# Patient Record
Sex: MALE | Race: BLACK OR AFRICAN AMERICAN | NOT HISPANIC OR LATINO | Employment: FULL TIME | URBAN - METROPOLITAN AREA
[De-identification: names, ages, dates, MRNs, and addresses within clinical notes are randomized per-mention and may not be internally consistent; named-entity substitution may affect disease eponyms.]

---

## 2017-08-07 ENCOUNTER — OFFICE VISIT (OUTPATIENT)
Dept: INTERNAL MEDICINE | Facility: CLINIC | Age: 42
End: 2017-08-07
Payer: COMMERCIAL

## 2017-08-07 VITALS
OXYGEN SATURATION: 98 % | TEMPERATURE: 96 F | BODY MASS INDEX: 31.77 KG/M2 | SYSTOLIC BLOOD PRESSURE: 178 MMHG | WEIGHT: 247.56 LBS | HEIGHT: 74 IN | HEART RATE: 85 BPM | DIASTOLIC BLOOD PRESSURE: 100 MMHG

## 2017-08-07 DIAGNOSIS — I10 ESSENTIAL HYPERTENSION: Primary | ICD-10-CM

## 2017-08-07 PROCEDURE — 99999 PR PBB SHADOW E&M-NEW PATIENT-LVL III: CPT | Mod: PBBFAC,,, | Performed by: PHYSICIAN ASSISTANT

## 2017-08-07 PROCEDURE — 99203 OFFICE O/P NEW LOW 30 MIN: CPT | Mod: S$GLB,,, | Performed by: PHYSICIAN ASSISTANT

## 2017-08-07 PROCEDURE — 3008F BODY MASS INDEX DOCD: CPT | Mod: S$GLB,,, | Performed by: PHYSICIAN ASSISTANT

## 2017-08-07 RX ORDER — AMLODIPINE BESYLATE 5 MG/1
5 TABLET ORAL DAILY
Qty: 30 TABLET | Refills: 0 | Status: SHIPPED | OUTPATIENT
Start: 2017-08-07 | End: 2017-08-14 | Stop reason: SDUPTHER

## 2017-08-07 NOTE — PATIENT INSTRUCTIONS
Eating Heart-Healthy Foods  Eating has a big impact on your heart health. In fact, eating healthier can improve several of your heart risks at once. For instance, it helps you manage weight, cholesterol, and blood pressure. Here are ideas to help you make heart-healthy changes without giving up all the foods and flavors you love.  Getting started  · Talk with your health care provider about eating plans, such as the DASH or Mediterranean diet. You may also be referred to a dietitian.  · Change a few things at a time. Give yourself time to get used to a few eating changes before adding more.  · Work to create a tasty, healthy eating plan that you can stick to for the rest of your life.    Goals for healthy eating  Below are some tips to improve your eating habits:  · Limit saturated fats and trans fats. Saturated fats raise your levels of cholesterol, so keep these fats to a minimum. They are found in foods such as fatty meats, whole milk, cheese, and palm and coconut oils. Avoid trans fats because they lower good cholesterol as well as raise bad cholesterol. Trans fats are most often found in processed foods.  · Reduce sodium (salt) intake. Eating too much salt may increase your blood pressure. Limit your sodium intake to 2,300 milligrams (mg) per day, or less if your health care provider recommends it. Dining out less often and eating fewer processed foods are two great ways to decrease the amount of salt you consume.  · Managing calories. A calorie is a unit of energy. Your body burns calories for fuel, but if you eat more calories than your body burns, the extras are stored as fat. Your health care provider can help you create a diet plan to manage your calories. This will likely include eating healthier foods as well as exercising regularly. To help you track your progress, keep a diary to record what you eat and how often you exercise.  Choose the right foods  Aim to make these foods staples of your diet. If  you have diabetes, you may have different recommendations than what is listed here:  · Fruits and vegetable provide plenty of nutrients without a lot of calories. At meals, fill half your plate with these foods. Split the other half of your plate between whole grains and lean protein.  · Whole grains are high in fiber and rich in vitamins and nutrients. Good choices include whole-wheat bread, pasta, and brown rice.  · Lean proteins give you nutrition with less fat. Good choices include fish, skinless chicken, and beans.  · Low-fat or nonfat dairy provides nutrients without a lot of fat. Try low-fat or nonfat milk, cheese, or yogurt.  · Healthy fats can be good for you in small amounts. These are unsaturated fats, such as olive oil, nuts, and fish. Try to have at least 2 servings per week of fatty fish such as salmon, sardines, mackerel, rainbow trout, and albacore tuna. These contain omega-3 fatty acids, which are good for your heart. Flaxseed is another source of a heart-healthy fat.  More on heart healthy eating    Read food labels  Healthy eating starts at the grocery store. Be sure to pay attention to food labels on packaged foods. Look for products that are high in fiber and protein, and low in saturated fat, cholesterol, and sodium. Avoid products that contain trans fat. And pay close attention to serving size. For instance, if you plan to eat two servings, double all the numbers on the label.  Prepare food right  A key part of healthy cooking is cutting down on added fat and salt. Look on the internet for lower-fat, lower-sodium recipes. Also, try these tips:  · Remove fat from meat and skin from poultry before cooking.  · Skim fat from the surface of soups and sauces.  · Broil, boil, bake, steam, grill, and microwave food without added fats.  · Choose ingredients that spice up your food without adding calories, fat, or sodium. Try these items: horseradish, hot sauce, lemon, mustard, nonfat salad dressings,  and vinegar. For salt-free herbs and spices, try basil, cilantro, cinnamon, pepper, and rosemary.  Date Last Reviewed: 6/25/2015 © 2000-2016 [employer]. [address] [place] [address], [place] [address]. All rights reserved. This information is not intended as a substitute for professional medical care. Always follow your healthcare professional's instructions.        Exercise for a Healthier Heart  You may wonder how you can improve the health of your heart. If youre thinking about exercise, youre on the right track. You dont need to become an athlete, but you do need a certain amount of brisk exercise to help strengthen your heart. If you have been diagnosed with a heart condition, your doctor may recommend exercise to help stabilize your condition. To help make exercise a habit, choose safe, fun activities.     Exercise with a friend. When activity is fun, you're more likely to stick with it.     Be sure to check with your healthcare provider before starting an exercise program.   Why exercise?  Exercising regularly offers many healthy rewards. It can help you do all of the following:  · Improve your blood cholesterol level to help prevent further heart trouble  · Lower your blood pressure to help prevent a stroke or heart attack  · Control diabetes, or reduce your risk of getting this disease  · Improve your heart and lung function  · Reach and maintain a healthy weight  · Make your muscles stronger and more limber so you can stay active  · Prevent falls and fractures by slowing the loss of bone mass (osteoporosis)  · Manage stress better  · Reduce your blood pressure  · Improve your sense of self and your body image  Exercise tips  Ease into your routine. Set small goals. Then build on them.  Exercise on most days. Aim for a total of 150 or more minutes of moderate to  vigorous intensity activity each week. Consider 40 minutes, 3 to 4 times a week. For best results, activity should last for 40  minutes on average. It is OK to work up to the 40 minute period over time. Examples of moderate-intensity activity is walking 1 mile in 15 minutes or 30 to 45 minutes of yard work.  Step up your daily activity level. Along with your exercise program, try being more active throughout the day. Walk instead of drive. Do more household tasks or yard work.  Choose one or more activities you enjoy. Walking is one of the easiest things you can do. You can also try swimming, riding a bike, dancing, or taking an exercise class.  Stop exercising and call your doctor if you:  · Have chest pain or feel dizzy or lightheaded  · Feel burning, tightness, pressure, or heaviness in your chest, neck, shoulders, back, or arms  · Have unusual shortness of breath  · Have increased joint or muscle pain  · Have palpitations or an irregular heartbeat   Date Last Reviewed: 5/1/2016 © 2000-2016 [employer]. [address] [place] [address]. All rights reserved. This information is not intended as a substitute for professional medical care. Always follow your healthcare professional's instructions.        Discharge Instructions: Taking Your Blood Pressure  Blood pressure is the force of blood as it moves from the heart through the blood vessels. You can take your own blood pressure reading using a digital monitor. Take readings as often as your healthcare provider instructs. Take your readings each time in the same way, using the same arm. Here are guidelines for taking your blood pressure.  The American Heart Association (AHA) recommends purchasing a blood pressure monitor that is validated and approved by the Association for the Advancement of Medical Instrumentation, the [place] Hypertension Society, and the International Protocol for the Validation of Automated BP Measuring Devices. If the blood pressure monitor is for a senior adult, a pregnant woman, or a child, make certain it is validated for use with such a  population. For the most reliable readings, the AHA recommends an automatic, cuff-style, upper arm (bicep) monitor. The readings from finger and wrist monitors are not as reliable as the upper arm monitor.        Step 1. Relax    · Wait at least a half hour after smoking, eating, or exercising. Do not drink coffee, tea, soda, or other caffeinated beverages before checking your blood pressure.   · Sit comfortably at a table. Place the monitor near you.  · Rest for a few minutes before you begin.        Step 2. Wrap the cuff    · Place your arm on the table, palm up. Put your arm in a position that is level with your heart. Wrap the cuff around your upper arm, about an inch above your elbow. Its best to wrap the cuff on bare skin, not over clothing.  · Make sure your cuff fits. If it doesnt wrap around your upper arm, order a larger cuff. A cuff that is too large or too small can result in an inaccurate blood pressure reading.           Step 3. Inflate the cuff    · Pump the cuff until the scale reads 200. If you have a self-inflating cuff, push the button that starts the pump.  · The cuff will tighten, then loosen.  · The numbers will change. When they stop changing, your blood pressure reading will appear.  · If you get a reading that is too high or too low for you, relax for a few minutes. Then do the test again.    Step 4. Write down the results  · Write down your blood pressure numbers. Leonardo the date and time. Keep your results in one place, such as a notebook.  · Remove the cuff from your arm. Turn off the machine.  · Take the readings with you to your medical appointments.  · If you start a new blood pressure medicine, or change a blood pressure medicine dose, note the day you started the new drug or dosage on your blood pressure recording sheet. This will help your healthcare provider monitor the effect of medication changes.     Date Last Reviewed: 4/27/2016  © 1466-5207 [employer] [address]  [place] [address]. All rights reserved. This information is not intended as a substitute for professional medical care. Always follow your healthcare professional's instructions.        Taking Your Blood Pressure  Blood pressure is the force of blood against the artery wall as it moves from the heart through the blood vessels. You can take your own blood pressure reading using a digital monitor. Take readings as often as your healthcare provider instructs. Take each reading at the same time of day.  Step 1. Relax    · Take your blood pressure at the same time every day, such as in the morning or evening, or at the time your healthcare provider recommends.  · Wait at least a half-hour after smoking, eating, drinking caffeinated beverages, or exercising.  · Sit comfortably at a table with both feet on the floor. Do not cross your legs or feet. Place the monitor near you.  · Rest for a few minutes before you begin.  Step 2. Wrap the cuff    · Place your arm on the table, palm up. Your arm should be at the level of your heart. Wrap the cuff around your upper arm, just above your elbow. Its best done on bare skin, not over clothing. Most cuffs will indicate where the brachial artery (the blood vessel in the middle of the arm at the inner side of the elbow) should line up with the cuff. Look in your monitor's instruction booklet for an illustration. You can also bring your cuff to your healthcare provider and have them show you how to correctly place the cuff.  · Make sure your cuff fits. If it doesnt wrap around your upper arm, order a larger cuff.  Step 3. Inflate the cuff    · Pump the cuff until the scale reads 160. If you have a self-inflating cuff, push the button that starts the pump.  · The cuff will tighten, then loosen.  · The numbers will change. When they stop changing, your blood pressure reading will appear.  · Take 2 or 3 readings one minute apart.  Step 4. Write down the results of  each reading    · Write down your blood pressure numbers for each reading. Note the date and time. Keep your results in one place, such as a notebook. Even if your monitor has a built-in memory, keep a hard copy of the readings.  · Remove the cuff from your arm. Turn off the machine.  · Share your blood pressure records with your healthcare providers at each visit.  Date Last Reviewed: 4/27/2016 © 2000-2016 [employer] [address] [place] [address], [place] [address]. All rights reserved. This information is not intended as a substitute for professional medical care. Always follow your healthcare professional's instructions.        What is High Blood Pressure?  High blood pressure (also called hypertension) is known as the silent killer. This is because most of the time it doesnt cause symptoms. In fact, many people dont know they have it until other problems develop. In most cases, high blood pressure cant be cured. Its a disease that often requires lifelong treatment. The good news is that it can be managed.  Understanding blood pressure  The circulatory system is made up of the heart and blood vessels that carry blood through the body. Your heart is the pump for this system. With each heartbeat (contraction), the heart sends blood out through large blood vessels called arteries. Blood pressure is a measure of how hard the moving blood pushes against the walls of the arteries.  High blood pressure can harm your health  In a healthy blood vessel, the blood moves smoothly through the vessel and puts normal pressure on the vessel walls.       High blood pressure occurs when blood pushes too hard against artery walls. This causes damage to the artery walls and then the formation of scar tissue as it heals. This makes the arteries stiff and weak. Plaque sticks to the scarred tissue narrowing and hardening the arteries. High blood pressure also causes your heart to work harder to get blood out to the  "body. High blood pressure raises your risk of heart attack, also known as acute myocardial infarction, or AMI, and stroke. It can also lead to kidney disease, and blindness.  Measuring blood pressure  An example of a blood pressure measurement is 120/70 (120 over 70). The top number is the pressure of blood against the artery walls during a heartbeat (systolic). The bottom number is the pressure of blood against artery walls between heartbeats (diastolic). Talk with your healthcare provider to find out what your blood pressure goals should be.   Controlling blood pressure  If your blood pressure is too high, work with your doctor on a plan for lowering it. Below are steps you can take that will help lower your blood pressure.  · Choose heart-healthy foods. Eating healthier meals helps you control your blood pressure. Ask your doctor about the DASH eating plan. This plan helps reduce blood pressure by limiting the amount of sodium (salt) you have in your diet. DASH also encourages eating plenty of fruits and vegetables, low-fat or non-fat dairy, whole-grains, and foods high in fiber, and low in fat.  · Reduce sodium. Reducing sodium in your diet reduces fluid retention. Fluid retention caused by too much salt increases blood volume and blood pressure. The American Heart Associations (AHA) "ideal" sodium intake recommendation is 1,500 milligrams per day.  However, since American's eat so much salt, the AHA says a positive change can occur by cutting back to even 2,400 milligrams of sodium a day.  · Maintain a healthy weight. Being overweight makes you more likely to have high blood pressure. Losing excess weight helps lower blood pressure.  · Exercise regularly. Daily exercise helps your heart and blood vessels work better and stay healthier. It can help lower your blood pressure.  · Stop smoking. Smoking increases blood pressure and damages blood vessels.  · Limit alcohol. Drinking too much alcohol can raise blood " pressure. Men should have no more than 2 drinks a day. Women should have no more than 1. (A drink is equal to 1 beer, or a small glass of wine, or a shot of liquor.)  · Control stress. Stress makes your heart work harder and beat faster. Controlling stress helps you control your blood pressure.  Facts about high blood pressure  · Feeling OK does not mean that blood pressure is under control. Likewise, feeling bad doesnt mean its out of control. The only way to know for sure is to check your pressure regularly.  · Medicine is only one part of controlling high blood pressure. You also need to manage your weight, get regular exercise, and adjust your eating habits.  · High blood pressure is usually a lifelong problem. But it can be controlled with healthy lifestyle changes and medicine.  · Hypertension is not the same as stress. Although stress may be a factor in high blood pressure, its only one part of the story.  · Blood pressure medicines need to be taken every day. Stopping suddenly may cause a dangerous increase in pressure.   Date Last Reviewed: 4/27/2016 © 2000-2016 [employer]. [address] [place], [place] [id number]. All rights reserved. This information is not intended as a substitute for professional medical care. Always follow your healthcare professional's instructions.        Your High Blood Pressure Risk Factors  Risk factors are things that make you more likely to have a disease or condition. Do you know your risk factors for high blood pressure? You cant do anything about some risk factors. But other risk factors are things that can be changed. Know what high blood pressure risk factors you have. Then find out what changes you can make to help control your risk for high blood pressure. Start with the change that you think will be easiest for you.  Risk factors you cant control  Though you cant change any of the things listed below, check off the ones that apply to you. The more  boxes you check, the greater your risk for high blood pressure.  Family history  ? One or both of your parents or grandparents has had high blood pressure or heart disease.  Gender and age  ? Youre a man over age 55 or a postmenopausal woman.  Risk factors you can control  There are plenty of risk factors for high blood pressure that you can control. Learn what these risk factors are and then find out how to reduce your risk. Check the ones that apply to you.  ? What you eat  Do you eat a lot of salty, fatty, fried, or greasy foods? Do you go to restaurants or eat out frequently?  ? Alcohol consumption  Do you drink more than 1 drink a day if you are a female or more than 2 drinks a day if you male?   ? If you smoke or someone close to you smokes  Do you smoke cigarettes or cigars, chew tobacco, or dip snuff? Are you exposed to second-hand smoke on a regular basis?  ? How active you are   Are you inactive most of the time at work and at home? Do you go weeks without exercising?  ? Your weight   Has your doctor said that you are 15 or more pounds overweight?  ? Your stress level    Do you often feel anxious, nervous, and stressed? Do you feel that you don't have a supportive environment?  Date Last Reviewed: 4/27/2016 © 2000-2016 [employer]. [address] [place] [address], [place] [address]. All rights reserved. This information is not intended as a substitute for professional medical care. Always follow your healthcare professional's instructions.        High Blood Pressure, New, Begin Treatment  Your blood pressure was high enough today to start treatment with medicines. Often health care providers dont know what causes high blood pressure (hypertension). But it can be controlled with lifestyle changes and medicines. High blood pressure usually has no symptoms. But it can sometimes cause headache, dizziness, blurred vision, a rushing sound in your ears, chest pain, or shortness of breath. But even without  symptoms, high blood pressure thats not treated raises your risk for heart attack and stroke. High blood pressure is a serious health risk and shouldnt be ignored.    A blood pressure reading is made up of two numbers: a higher number over a lower number. The top number is the systolic pressure. The bottom number is the diastolic pressure. A normal blood pressure is less than 120 over less than 80.  High blood pressure is when either the top number is 140 or higher, or the bottom number is 90 or higher. This must be the result when taking your blood pressure a number of times. The blood pressures between normal and high are called prehypertension.  Home care  If you have high blood pressure, you should do what is listed below to lower your blood pressure. If you are taking medicines for high blood pressure, these methods may reduce or end your need for medicines in the future.  · Begin a weight-loss program if you are overweight.  · Cut back on how much salt you get in your diet. Heres how to do this:  ¨ Dont eat foods that have a lot of salt. These include olives, pickles, smoked meats, and salted potato chips.  ¨ Dont add salt to your food at the table.  ¨ Use only small amounts of salt when cooking.  · Begin an exercise program. Talk with your health care provider about the type of exercise program that would be best for you. It doesn't have to be hard. Even brisk walking for 20 minutes 3 times a week is a good form of exercise.  · Dont take medicines that have heart stimulants. This includes many cold and sinus decongestant pills and sprays, as well as diet pills. Check the warnings about hypertension on the label. Stimulants such as amphetamine or cocaine could be lethal for someone with high blood pressure. Never take these.  · Limit how much caffeine you get in your diet. Switch to caffeine-free products.  · Stop smoking. If you are a long-time smoker, this can be hard. Enroll in a stop-smoking program  to make it more likely that you will quit for good.  · Learn how to handle stress. This is an important part of any program to lower blood pressure. Learn about relaxation methods like meditation, yoga, or biofeedback.  · If your provider prescribed medicines, take them exactly as directed. Missing doses may cause your blood pressure get out of control.  · Consider buying an automatic blood pressure machine. You can get one of these at most pharmacies. Use this to watch your blood pressure at home. Give the results to your provider.  Follow-up care  Because a new blood pressure medicine was started today, its important that you have your blood pressure rechecked. This is to make sure that the medicine is working and that you have no serious side effects. Unless told otherwise, follow up with your health care provider or this facility within the next 3 days.  When to seek medical advice  Call your health care provider right away if any of these occur:  · Chest pain or shortness of breath  · Severe headache  · Throbbing or rushing sound in the ears  · Nosebleed  · Sudden severe pain in your belly (abdomen)  · Extreme drowsiness, confusion, or fainting  · Dizziness or dizziness with a spinning sensation (vertigo)  · Weakness of an arm or leg or one side of the face  · You have problems speaking or seeing   Date Last Reviewed: 11/25/2014  © 5362-8709 [employer]. [address] [place] [address], [place] [address]. All rights reserved. This information is not intended as a substitute for professional medical care. Always follow your healthcare professional's instructions.        High Blood Pressure, To Be Confirmed, No Treatment  Your blood pressure today was higher than normal. Sometimes anxiety or pain can cause a temporary rise in blood pressure. It later returns to normal. Blood pressure that is high only one time doesnt mean that you have high blood pressure (hypertension). High blood pressure is a chronic  illness. But you should have your blood pressure measured again within the next few days to find out if its still high.    A blood pressure reading is made up of two numbers: a higher number over a lower number. The top number is the systolic pressure. The bottom number is the diastolic pressure. A normal blood pressure is less than 120 over less than 80.  High blood pressure is when either the top number is 140 or higher, or the bottom number is 90 or higher. This must be the result when taking your blood pressure a number of times.   The blood pressures between normal and high are called prehypertension. This is systolic pressure of 120 to 140 or diastolic pressure of 80 to 89. Prehypertension means you are at risk of getting high blood pressure. You should have your blood pressure checked regularly to be sure it isnt rising.  Home care  Measure your blood pressure on 3 different days and write down the results. This can be done at your health care provider's office or at this facility. Some pharmacies and grocery stores have automated blood pressure machines that you can use.  Follow-up care  If your blood pressure is high (more than 120 over 80) on 2 out of 3 days, you will need to follow up with your health care provider for more evaluation and treatment.  Dont put this off! High blood pressure can be treated. High blood pressure thats not treated raises your risk for heart attack and stroke.  When to seek medical advice  Call your health care provider right away if any of these occur:  · Chest pain or shortness of breath  · Severe headache  · Throbbing or rushing sound in the ears  · Nosebleed  · Sudden severe pain in your belly (abdomen)  · Extreme drowsiness, confusion, or fainting  · Dizziness or dizziness with spinning sensation (vertigo)  · Weakness of an arm or leg or one side of the face  · You have problems speaking or seeing   Date Last Reviewed: 11/25/2014  © 6719-9681 [employer]  [address] [place] [id number]. All rights reserved. This information is not intended as a substitute for professional medical care. Always follow your healthcare professional's instructions.

## 2017-08-07 NOTE — PROGRESS NOTES
Subjective:      Patient ID: Vinay Velazquez is a 42 y.o. male.    Chief Complaint: Hypertension    Does exercise and eats healthy.      Hypertension   This is a new problem. The current episode started in the past 7 days. The problem is unchanged. The problem is uncontrolled. Pertinent negatives include no anxiety, blurred vision, chest pain, headaches, malaise/fatigue, neck pain, orthopnea, palpitations, peripheral edema, PND, shortness of breath or sweats. There are no associated agents to hypertension. Risk factors for coronary artery disease include male gender and smoking/tobacco exposure. Past treatments include nothing. The current treatment provides no improvement. There are no compliance problems.  There is no history of angina, kidney disease, CAD/MI, CVA, heart failure, left ventricular hypertrophy, PVD, renovascular disease, retinopathy or a thyroid problem. There is no history of chronic renal disease, coarctation of the aorta, hyperaldosteronism, hypercortisolism, hyperparathyroidism, a hypertension causing med, pheochromocytoma or sleep apnea.   Had physical for work and blood pressure was 160/110 and 164/104 on 8/3/2017 and 8/4/2017.  Does not have a PCP    Review of Systems   Constitutional: Negative for activity change, appetite change, chills, diaphoresis, fatigue, fever, malaise/fatigue and unexpected weight change.   HENT: Negative.  Negative for congestion, hearing loss, postnasal drip, rhinorrhea, sore throat, trouble swallowing and voice change.    Eyes: Negative.  Negative for blurred vision and visual disturbance.   Respiratory: Negative.  Negative for cough, choking, chest tightness and shortness of breath.    Cardiovascular: Negative for chest pain, palpitations, orthopnea, leg swelling and PND.   Gastrointestinal: Negative for abdominal distention, abdominal pain, blood in stool, constipation, diarrhea, nausea and vomiting.   Endocrine: Negative for cold intolerance, heat intolerance,  "polydipsia and polyuria.   Genitourinary: Negative.  Negative for difficulty urinating and frequency.   Musculoskeletal: Negative for arthralgias, back pain, gait problem, joint swelling, myalgias and neck pain.   Skin: Negative for color change, pallor, rash and wound.   Neurological: Negative for dizziness, tremors, weakness, light-headedness, numbness and headaches.   Hematological: Negative for adenopathy.   Psychiatric/Behavioral: Negative for behavioral problems, confusion, self-injury, sleep disturbance and suicidal ideas. The patient is not nervous/anxious.      Objective:   BP (!) 178/100   Pulse 85   Temp 96.2 °F (35.7 °C) (Tympanic)   Ht 6' 2" (1.88 m)   Wt 112.3 kg (247 lb 9.2 oz)   SpO2 98%   BMI 31.79 kg/m²     Physical Exam   Constitutional: He is oriented to person, place, and time. He appears well-developed and well-nourished.   HENT:   Head: Normocephalic and atraumatic.   Right Ear: External ear normal.   Left Ear: External ear normal.   Nose: Nose normal.   Mouth/Throat: Oropharynx is clear and moist.   Eyes: Conjunctivae and EOM are normal. Pupils are equal, round, and reactive to light.   Neck: Normal range of motion. Neck supple.   Cardiovascular: Normal rate, regular rhythm and normal heart sounds.  Exam reveals no gallop and no friction rub.    No murmur heard.  Pulmonary/Chest: Effort normal and breath sounds normal. No respiratory distress. He has no wheezes. He has no rales. He exhibits no tenderness.   Abdominal: Soft. He exhibits no distension. There is no tenderness.   Musculoskeletal: Normal range of motion.   Lymphadenopathy:     He has no cervical adenopathy.   Neurological: He is alert and oriented to person, place, and time.   Skin: Skin is warm and dry.   Psychiatric: He has a normal mood and affect. His behavior is normal. Judgment and thought content normal.   Vitals reviewed.      Assessment:     1. Essential hypertension      Plan:   Essential hypertension  -     CBC " auto differential; Future; Expected date: 08/07/2017  -     Comprehensive metabolic panel; Future  -     TSH; Future  -     Lipid panel; Future; Expected date: 08/07/2017  -     Hemoglobin A1c; Future; Expected date: 08/07/2017  -     amlodipine (NORVASC) 5 MG tablet; Take 1 tablet (5 mg total) by mouth once daily.  Dispense: 30 tablet; Refill: 0    -will get established with PCP her at [facility].   -discussed risks associated with uncontrolled blood pressure. A handout provided. A handout on how to check blood pressure at home was printed for her today.   -log blood pressure and bring to follow up appointment.     Return in about 4 days (around 8/11/2017), or if symptoms worsen or fail to improve.

## 2017-08-14 ENCOUNTER — LAB VISIT (OUTPATIENT)
Dept: LAB | Facility: HOSPITAL | Age: 42
End: 2017-08-14
Attending: PHYSICIAN ASSISTANT
Payer: COMMERCIAL

## 2017-08-14 ENCOUNTER — CLINICAL SUPPORT (OUTPATIENT)
Dept: INTERNAL MEDICINE | Facility: CLINIC | Age: 42
End: 2017-08-14
Payer: COMMERCIAL

## 2017-08-14 VITALS — DIASTOLIC BLOOD PRESSURE: 90 MMHG | SYSTOLIC BLOOD PRESSURE: 158 MMHG

## 2017-08-14 DIAGNOSIS — I10 ESSENTIAL HYPERTENSION: ICD-10-CM

## 2017-08-14 LAB
ALBUMIN SERPL BCP-MCNC: 4.2 G/DL
ALP SERPL-CCNC: 37 U/L
ALT SERPL W/O P-5'-P-CCNC: 25 U/L
ANION GAP SERPL CALC-SCNC: 7 MMOL/L
AST SERPL-CCNC: 70 U/L
BASOPHILS # BLD AUTO: 0.01 K/UL
BASOPHILS NFR BLD: 0.2 %
BILIRUB SERPL-MCNC: 1.5 MG/DL
BUN SERPL-MCNC: 14 MG/DL
CALCIUM SERPL-MCNC: 9.5 MG/DL
CHLORIDE SERPL-SCNC: 101 MMOL/L
CHOLEST/HDLC SERPL: 3.3 {RATIO}
CO2 SERPL-SCNC: 29 MMOL/L
CREAT SERPL-MCNC: 0.9 MG/DL
DIFFERENTIAL METHOD: ABNORMAL
EOSINOPHIL # BLD AUTO: 0.1 K/UL
EOSINOPHIL NFR BLD: 1.1 %
ERYTHROCYTE [DISTWIDTH] IN BLOOD BY AUTOMATED COUNT: 13.3 %
EST. GFR  (AFRICAN AMERICAN): >60 ML/MIN/1.73 M^2
EST. GFR  (NON AFRICAN AMERICAN): >60 ML/MIN/1.73 M^2
GLUCOSE SERPL-MCNC: 79 MG/DL
HCT VFR BLD AUTO: 50.8 %
HDL/CHOLESTEROL RATIO: 30 %
HDLC SERPL-MCNC: 140 MG/DL
HDLC SERPL-MCNC: 42 MG/DL
HGB BLD-MCNC: 17.2 G/DL
LDLC SERPL CALC-MCNC: 71.6 MG/DL
LYMPHOCYTES # BLD AUTO: 2 K/UL
LYMPHOCYTES NFR BLD: 44.2 %
MCH RBC QN AUTO: 31.2 PG
MCHC RBC AUTO-ENTMCNC: 33.9 G/DL
MCV RBC AUTO: 92 FL
MONOCYTES # BLD AUTO: 0.6 K/UL
MONOCYTES NFR BLD: 13.3 %
NEUTROPHILS # BLD AUTO: 1.8 K/UL
NEUTROPHILS NFR BLD: 41 %
NONHDLC SERPL-MCNC: 98 MG/DL
PLATELET # BLD AUTO: 255 K/UL
PMV BLD AUTO: 13 FL
POTASSIUM SERPL-SCNC: 4.2 MMOL/L
PROT SERPL-MCNC: 7.4 G/DL
RBC # BLD AUTO: 5.52 M/UL
SODIUM SERPL-SCNC: 137 MMOL/L
TRIGL SERPL-MCNC: 132 MG/DL
TSH SERPL DL<=0.005 MIU/L-ACNC: 0.77 UIU/ML
WBC # BLD AUTO: 4.43 K/UL

## 2017-08-14 PROCEDURE — 99999 PR PBB SHADOW E&M-EST. PATIENT-LVL I: CPT | Mod: PBBFAC,,,

## 2017-08-14 RX ORDER — AMLODIPINE BESYLATE 5 MG/1
10 TABLET ORAL DAILY
Qty: 60 TABLET | Refills: 0
Start: 2017-08-14 | End: 2017-08-29 | Stop reason: SDUPTHER

## 2017-08-15 LAB
ESTIMATED AVG GLUCOSE: 97 MG/DL
HBA1C MFR BLD HPLC: 5 %

## 2017-08-18 ENCOUNTER — CLINICAL SUPPORT (OUTPATIENT)
Dept: INTERNAL MEDICINE | Facility: CLINIC | Age: 42
End: 2017-08-18
Payer: COMMERCIAL

## 2017-08-18 VITALS — DIASTOLIC BLOOD PRESSURE: 96 MMHG | SYSTOLIC BLOOD PRESSURE: 154 MMHG

## 2017-08-18 NOTE — PROGRESS NOTES
Patient in clinic for nurse visit. /96. States he has started taking norvasc as prescribed (10mg). Presented readings to Dr. Ahn. Per Dr. Ahn have patient to monitor BP at home and return in 10 days for nurse visit for BP check. Informed Dr. Ahn patient has an appt with Dr. Spence on 8/29/17 ([facility]). Per Dr. Ahn BP can be addressed during appt on 8/29/2017.//ddw

## 2017-08-29 ENCOUNTER — OFFICE VISIT (OUTPATIENT)
Dept: INTERNAL MEDICINE | Facility: CLINIC | Age: 42
End: 2017-08-29
Payer: COMMERCIAL

## 2017-08-29 VITALS
WEIGHT: 240.31 LBS | HEIGHT: 74 IN | BODY MASS INDEX: 30.84 KG/M2 | SYSTOLIC BLOOD PRESSURE: 136 MMHG | TEMPERATURE: 97 F | DIASTOLIC BLOOD PRESSURE: 88 MMHG | HEART RATE: 77 BPM | OXYGEN SATURATION: 98 %

## 2017-08-29 DIAGNOSIS — R74.8 ELEVATED LIVER ENZYMES: ICD-10-CM

## 2017-08-29 DIAGNOSIS — I10 ESSENTIAL HYPERTENSION: ICD-10-CM

## 2017-08-29 DIAGNOSIS — F17.200 SMOKER: ICD-10-CM

## 2017-08-29 DIAGNOSIS — Z00.00 ROUTINE GENERAL MEDICAL EXAMINATION AT A HEALTH CARE FACILITY: Primary | ICD-10-CM

## 2017-08-29 PROCEDURE — 90472 IMMUNIZATION ADMIN EACH ADD: CPT | Mod: S$GLB,,, | Performed by: INTERNAL MEDICINE

## 2017-08-29 PROCEDURE — 90471 IMMUNIZATION ADMIN: CPT | Mod: S$GLB,,, | Performed by: INTERNAL MEDICINE

## 2017-08-29 PROCEDURE — 90732 PPSV23 VACC 2 YRS+ SUBQ/IM: CPT | Mod: S$GLB,,, | Performed by: INTERNAL MEDICINE

## 2017-08-29 PROCEDURE — 90715 TDAP VACCINE 7 YRS/> IM: CPT | Mod: S$GLB,,, | Performed by: INTERNAL MEDICINE

## 2017-08-29 PROCEDURE — 99999 PR PBB SHADOW E&M-EST. PATIENT-LVL III: CPT | Mod: PBBFAC,,, | Performed by: INTERNAL MEDICINE

## 2017-08-29 PROCEDURE — 99396 PREV VISIT EST AGE 40-64: CPT | Mod: 25,S$GLB,, | Performed by: INTERNAL MEDICINE

## 2017-08-29 RX ORDER — AMLODIPINE BESYLATE 10 MG/1
10 TABLET ORAL DAILY
Qty: 90 TABLET | Refills: 0
Start: 2017-08-29 | End: 2017-09-26 | Stop reason: SDUPTHER

## 2017-08-29 RX ORDER — HYDROCHLOROTHIAZIDE 12.5 MG/1
12.5 CAPSULE ORAL DAILY
Qty: 90 CAPSULE | Refills: 3 | Status: SHIPPED | OUTPATIENT
Start: 2017-08-29 | End: 2017-11-18

## 2017-08-29 NOTE — PROGRESS NOTES
"Subjective:      Patient ID: Vinay Velazquez is a 42 y.o. male.    Chief Complaint: Establish Care    43 yo with h/o htn. Treated in 2014 but self dc'd tablets.     Here today for annual exam. Feeling well today. Stopped recovery pills for workouts 5 days ago. Thinks may have raised his bp.  Smokes cigarettes.  No f/h of colon or prostate cancer.  Compliant with meds without significant side effects. p      Review of Systems   Constitutional: Negative for chills and fever.   HENT: Negative for ear pain and sore throat.    Respiratory: Negative for cough.    Cardiovascular: Negative for chest pain.   Gastrointestinal: Negative for abdominal pain and blood in stool.   Genitourinary: Negative for dysuria and hematuria.   Neurological: Negative for dizziness, seizures, syncope and headaches.     Objective:   /88   Pulse 77   Temp 97 °F (36.1 °C) (Tympanic)   Ht 6' 2" (1.88 m)   Wt 109 kg (240 lb 4.8 oz)   SpO2 98%   BMI 30.85 kg/m²     Physical Exam   Constitutional: He is oriented to person, place, and time. He appears well-developed and well-nourished. No distress.   HENT:   Head: Normocephalic and atraumatic.   Mouth/Throat: Oropharynx is clear and moist.   Eyes: EOM are normal. Pupils are equal, round, and reactive to light.   Neck: Neck supple. Carotid bruit is not present. No thyromegaly present.   Cardiovascular: Normal rate and regular rhythm.    Pulmonary/Chest: Breath sounds normal. He has no wheezes. He has no rales.   Abdominal: Soft. Bowel sounds are normal. There is no tenderness.   Musculoskeletal: He exhibits no edema.   Lymphadenopathy:     He has no cervical adenopathy.   Neurological: He is alert and oriented to person, place, and time.   Skin: Skin is warm and dry.   Psychiatric: He has a normal mood and affect. His behavior is normal.     No visits with results within 2 Week(s) from this visit.   Latest known visit with results is:   Lab Visit on 08/14/2017   Component Date Value Ref " Range Status    WBC 08/14/2017 4.43  3.90 - 12.70 K/uL Final    RBC 08/14/2017 5.52  4.60 - 6.20 M/uL Final    Hemoglobin 08/14/2017 17.2  14.0 - 18.0 g/dL Final    Hematocrit 08/14/2017 50.8  40.0 - 54.0 % Final    MCV 08/14/2017 92  82 - 98 fL Final    MCH 08/14/2017 31.2* 27.0 - 31.0 pg Final    MCHC 08/14/2017 33.9  32.0 - 36.0 g/dL Final    RDW 08/14/2017 13.3  11.5 - 14.5 % Final    Platelets 08/14/2017 255  150 - 350 K/uL Final    MPV 08/14/2017 13.0* 9.2 - 12.9 fL Final    Gran # 08/14/2017 1.8  1.8 - 7.7 K/uL Final    Lymph # 08/14/2017 2.0  1.0 - 4.8 K/uL Final    Mono # 08/14/2017 0.6  0.3 - 1.0 K/uL Final    Eos # 08/14/2017 0.1  0.0 - 0.5 K/uL Final    Baso # 08/14/2017 0.01  0.00 - 0.20 K/uL Final    Gran% 08/14/2017 41.0  38.0 - 73.0 % Final    Lymph% 08/14/2017 44.2  18.0 - 48.0 % Final    Mono% 08/14/2017 13.3  4.0 - 15.0 % Final    Eosinophil% 08/14/2017 1.1  0.0 - 8.0 % Final    Basophil% 08/14/2017 0.2  0.0 - 1.9 % Final    Differential Method 08/14/2017 Automated   Final    Sodium 08/14/2017 137  136 - 145 mmol/L Final    Potassium 08/14/2017 4.2  3.5 - 5.1 mmol/L Final    Chloride 08/14/2017 101  95 - 110 mmol/L Final    CO2 08/14/2017 29  23 - 29 mmol/L Final    Glucose 08/14/2017 79  70 - 110 mg/dL Final    BUN, Bld 08/14/2017 14  6 - 20 mg/dL Final    Creatinine 08/14/2017 0.9  0.5 - 1.4 mg/dL Final    Calcium 08/14/2017 9.5  8.7 - 10.5 mg/dL Final    Total Protein 08/14/2017 7.4  6.0 - 8.4 g/dL Final    Albumin 08/14/2017 4.2  3.5 - 5.2 g/dL Final    Total Bilirubin 08/14/2017 1.5* 0.1 - 1.0 mg/dL Final    Comment: For infants and newborns, interpretation of results should be based  on gestational age, weight and in agreement with clinical  observations.  Premature Infant recommended reference ranges:  Up to 24 hours.............<8.0 mg/dL  Up to 48 hours............<12.0 mg/dL  3-5 days..................<15.0 mg/dL  6-29 days.................<15.0 mg/dL       Alkaline Phosphatase 08/14/2017 37* 55 - 135 U/L Final    AST 08/14/2017 70* 10 - 40 U/L Final    ALT 08/14/2017 25  10 - 44 U/L Final    Anion Gap 08/14/2017 7* 8 - 16 mmol/L Final    eGFR if African American 08/14/2017 >60.0  >60 mL/min/1.73 m^2 Final    eGFR if non African American 08/14/2017 >60.0  >60 mL/min/1.73 m^2 Final    Comment: Calculation used to obtain the estimated glomerular filtration  rate (eGFR) is the CKD-EPI equation. Since race is unknown   in our information system, the eGFR values for   -American and Non--American patients are given   for each creatinine result.      TSH 08/14/2017 0.768  0.400 - 4.000 uIU/mL Final    Cholesterol 08/14/2017 140  120 - 199 mg/dL Final    Comment: The National Cholesterol Education Program (NCEP) has set the  following guidelines (reference ranges) for Cholesterol:  Optimal.....................<200 mg/dL  Borderline High.............200-239 mg/dL  High........................> or = 240 mg/dL      Triglycerides 08/14/2017 132  30 - 150 mg/dL Final    Comment: The National Cholesterol Education Program (NCEP) has set the  following guidelines (reference values) for triglycerides:  Normal......................<150 mg/dL  Borderline High.............150-199 mg/dL  High........................200-499 mg/dL      HDL 08/14/2017 42  40 - 75 mg/dL Final    Comment: The National Cholesterol Education Program (NCEP) has set the  following guidelines (reference values) for HDL Cholesterol:  Low...............<40 mg/dL  Optimal...........>60 mg/dL      LDL Cholesterol 08/14/2017 71.6  63.0 - 159.0 mg/dL Final    Comment: The National Cholesterol Education Program (NCEP) has set the  following guidelines (reference values) for LDL Cholesterol:  Optimal.......................<130 mg/dL  Borderline High...............130-159 mg/dL  High..........................160-189 mg/dL  Very High.....................>190 mg/dL      HDL/Chol Ratio 08/14/2017  30.0  20.0 - 50.0 % Final    Total Cholesterol/HDL Ratio 08/14/2017 3.3  2.0 - 5.0 Final    Non-HDL Cholesterol 08/14/2017 98  mg/dL Final    Comment: Risk category and Non-HDL cholesterol goals:  Coronary heart disease (CHD)or equivalent (10-year risk of CHD >20%):  Non-HDL cholesterol goal     <130 mg/dL  Two or more CHD risk factors and 10-year risk of CHD <= 20%:  Non-HDL cholesterol goal     <160 mg/dL  0 to 1 CHD risk factor:  Non-HDL cholesterol goal     <190 mg/dL      Hemoglobin A1C 08/15/2017 5.0  4.0 - 5.6 % Final    Comment: According to ADA guidelines, hemoglobin A1c <7.0% represents  optimal control in non-pregnant diabetic patients. Different  metrics may apply to specific patient populations.   Standards of Medical Care in Diabetes-2016.  For the purpose of screening for the presence of diabetes:  <5.7%     Consistent with the absence of diabetes  5.7-6.4%  Consistent with increasing risk for diabetes   (prediabetes)  >or=6.5%  Consistent with diabetes  Currently, no consensus exists for use of hemoglobin A1c  for diagnosis of diabetes for children.  This Hemoglobin A1c assay has significant interference with fetal   hemoglobin   (HbF). The results are invalid for patients with abnormal amounts of   HbF,   including those with known Hereditary Persistence   of Fetal Hemoglobin. Heterozygous hemoglobin variants (HbAS, HbAC,   HbAD, HbAE, HbA2) do not significantly interfere with this assay;   however, presence of multiple variants in a sample may impact the %   interference.      Estimated Avg Glucose 08/15/2017 97  68 - 131 mg/dL Final         Assessment:     1. Routine general medical examination at a health care facility    2. Elevated liver enzymes    3. Smoker    4. Essential hypertension      Plan:   Routine general medical examination at a health care facility  -     (In Office Administered) Tdap Vaccine  -     (In Office Administered) Pneumococcal Polysaccharide Vaccine (23 Valent)  (SQ/IM)    Elevated liver enzymes  -     Hepatic function panel; Future; Expected date: 09/29/2017    Smoker  -     Ambulatory referral to Smoking Cessation Program    Essential hypertension  -     hydrochlorothiazide (MICROZIDE) 12.5 mg capsule; Take 1 capsule (12.5 mg total) by mouth once daily.  Dispense: 90 capsule; Refill: 3  -     amlodipine (NORVASC) 10 MG tablet; Take 1 tablet (10 mg total) by mouth once daily.  Dispense: 90 tablet; Refill: 0    Heart healthy diet and reg exercise  HM reviewed with pt.  Advised smoking cessation.        Return in about 4 weeks (around 9/26/2017), or if symptoms worsen or fail to improve.

## 2017-09-12 ENCOUNTER — PATIENT OUTREACH (OUTPATIENT)
Dept: ADMINISTRATIVE | Facility: HOSPITAL | Age: 42
End: 2017-09-12

## 2017-09-26 ENCOUNTER — OFFICE VISIT (OUTPATIENT)
Dept: INTERNAL MEDICINE | Facility: CLINIC | Age: 42
End: 2017-09-26
Payer: COMMERCIAL

## 2017-09-26 ENCOUNTER — LAB VISIT (OUTPATIENT)
Dept: LAB | Facility: HOSPITAL | Age: 42
End: 2017-09-26
Attending: INTERNAL MEDICINE
Payer: COMMERCIAL

## 2017-09-26 ENCOUNTER — TELEPHONE (OUTPATIENT)
Dept: INTERNAL MEDICINE | Facility: CLINIC | Age: 42
End: 2017-09-26

## 2017-09-26 VITALS
HEIGHT: 74 IN | BODY MASS INDEX: 30.42 KG/M2 | SYSTOLIC BLOOD PRESSURE: 148 MMHG | DIASTOLIC BLOOD PRESSURE: 98 MMHG | WEIGHT: 237 LBS | HEART RATE: 75 BPM | TEMPERATURE: 97 F | OXYGEN SATURATION: 97 %

## 2017-09-26 DIAGNOSIS — R74.8 ELEVATED LIVER ENZYMES: ICD-10-CM

## 2017-09-26 DIAGNOSIS — F17.200 SMOKER: ICD-10-CM

## 2017-09-26 DIAGNOSIS — I10 ESSENTIAL HYPERTENSION: Primary | ICD-10-CM

## 2017-09-26 LAB
ALBUMIN SERPL BCP-MCNC: 4.1 G/DL
ALP SERPL-CCNC: 48 U/L
ALT SERPL W/O P-5'-P-CCNC: 17 U/L
AST SERPL-CCNC: 24 U/L
BILIRUB DIRECT SERPL-MCNC: 0.3 MG/DL
BILIRUB SERPL-MCNC: 0.6 MG/DL
PROT SERPL-MCNC: 7.4 G/DL

## 2017-09-26 PROCEDURE — 36415 COLL VENOUS BLD VENIPUNCTURE: CPT | Mod: PO

## 2017-09-26 PROCEDURE — 80076 HEPATIC FUNCTION PANEL: CPT

## 2017-09-26 PROCEDURE — 99213 OFFICE O/P EST LOW 20 MIN: CPT | Mod: S$GLB,,, | Performed by: INTERNAL MEDICINE

## 2017-09-26 PROCEDURE — 99999 PR PBB SHADOW E&M-EST. PATIENT-LVL III: CPT | Mod: PBBFAC,,, | Performed by: INTERNAL MEDICINE

## 2017-09-26 PROCEDURE — 3008F BODY MASS INDEX DOCD: CPT | Mod: S$GLB,,, | Performed by: INTERNAL MEDICINE

## 2017-09-26 RX ORDER — AMLODIPINE BESYLATE 10 MG/1
10 TABLET ORAL DAILY
Qty: 90 TABLET | Refills: 0
Start: 2017-09-26 | End: 2017-11-18

## 2017-09-26 NOTE — PROGRESS NOTES
"Subjective:      Patient ID: Vinay Velazquez is a 42 y.o. male.    Chief Complaint: Follow-up    41 yo with Patient Active Problem List:     Essential hypertension    Here today for management of htn.  He is compliant with his medications however his pharmacy never received the prescription for his amlodipine.  He denies any side effects of his medications.  He is feeling well today without any new complaints.        Review of Systems   Constitutional: Negative for chills and fever.   HENT: Negative for ear pain and sore throat.    Respiratory: Negative for cough.    Cardiovascular: Negative for chest pain.   Gastrointestinal: Negative for abdominal pain and blood in stool.   Genitourinary: Negative for dysuria and hematuria.   Neurological: Negative for seizures and syncope.     Objective:   BP (!) 148/98   Pulse 75   Temp 97.2 °F (36.2 °C) (Tympanic)   Ht 6' 2" (1.88 m)   Wt 107.5 kg (236 lb 15.9 oz)   SpO2 97%   BMI 30.43 kg/m²     Physical Exam   Constitutional: He appears well-developed and well-nourished. No distress.   Cardiovascular: Normal rate.    Pulmonary/Chest: Effort normal and breath sounds normal.   Musculoskeletal: He exhibits no edema.   Skin: Skin is warm and dry.   Psychiatric: He has a normal mood and affect. His behavior is normal.       Assessment:     1. Essential hypertension    2. Smoker      Plan:   Essential hypertension  -     amlodipine (NORVASC) 10 MG tablet; Take 1 tablet (10 mg total) by mouth once daily.  Dispense: 90 tablet; Refill: 0    Smoker    Other orders  -     Cancel: Influenza - Quadrivalent (3 years & older)    pt refused flu vaccine  Advise smoking cessation    Need bp check nurse visit in 3weeks.  Notify me if bp over 139/89 for consideration of increase hctz to 25 mg.     Lab Frequency Next Occurrence   Hepatic function panel Once 09/29/2017       Problem List Items Addressed This Visit        Cardiac/Vascular    Essential hypertension - Primary    Relevant " Medications    amlodipine (NORVASC) 10 MG tablet      Other Visit Diagnoses     Smoker              Return in about 6 weeks (around 11/7/2017), or if symptoms worsen or fail to improve.

## 2017-10-26 ENCOUNTER — PATIENT OUTREACH (OUTPATIENT)
Dept: INTERNAL MEDICINE | Facility: CLINIC | Age: 42
End: 2017-10-26

## 2017-11-18 ENCOUNTER — OFFICE VISIT (OUTPATIENT)
Dept: URGENT CARE | Facility: CLINIC | Age: 42
End: 2017-11-18
Payer: COMMERCIAL

## 2017-11-18 VITALS
SYSTOLIC BLOOD PRESSURE: 129 MMHG | DIASTOLIC BLOOD PRESSURE: 78 MMHG | WEIGHT: 244.06 LBS | TEMPERATURE: 98 F | HEART RATE: 75 BPM | HEIGHT: 74 IN | BODY MASS INDEX: 31.32 KG/M2

## 2017-11-18 DIAGNOSIS — H10.9 BACTERIAL CONJUNCTIVITIS OF LEFT EYE: Primary | ICD-10-CM

## 2017-11-18 PROCEDURE — 99999 PR PBB SHADOW E&M-EST. PATIENT-LVL III: CPT | Mod: PBBFAC,,, | Performed by: INTERNAL MEDICINE

## 2017-11-18 PROCEDURE — 99213 OFFICE O/P EST LOW 20 MIN: CPT | Mod: S$GLB,,, | Performed by: INTERNAL MEDICINE

## 2017-11-18 RX ORDER — ERYTHROMYCIN 5 MG/G
OINTMENT OPHTHALMIC EVERY 4 HOURS
Qty: 1 G | Refills: 0 | Status: SHIPPED | OUTPATIENT
Start: 2017-11-18

## 2017-11-18 RX ORDER — CIPROFLOXACIN HYDROCHLORIDE 3 MG/ML
1 SOLUTION/ DROPS OPHTHALMIC EVERY 4 HOURS
Qty: 10 ML | Refills: 0 | Status: SHIPPED | OUTPATIENT
Start: 2017-11-18

## 2017-11-18 RX ORDER — ERYTHROMYCIN 5 MG/G
OINTMENT OPHTHALMIC EVERY 4 HOURS
Qty: 1 G | Refills: 0 | Status: SHIPPED | OUTPATIENT
Start: 2017-11-18 | End: 2017-11-18 | Stop reason: SDUPTHER

## 2017-11-18 RX ORDER — CIPROFLOXACIN HYDROCHLORIDE 3 MG/ML
1 SOLUTION/ DROPS OPHTHALMIC EVERY 4 HOURS
Qty: 10 ML | Refills: 0 | Status: SHIPPED | OUTPATIENT
Start: 2017-11-18 | End: 2017-11-18 | Stop reason: SDUPTHER

## 2017-11-18 NOTE — PROGRESS NOTES
Subjective:      Patient ID: Vinay Velazquez is a 42 y.o. male.    Chief Complaint: Eye Problem (left )      HPI  Mr. Velazquez presents to urgent care for left eye slight pain/pressure since mid-day yesterday. This am he couldn't open his eye. +photophobia. No known glaucoma in his family. He endorses an accidental slipper to his left eye 4 days ago (daughter was kicking off her slipper), which caused pain and redness, which initially improved, but then seemed to worsen again. No blurry vision.     History reviewed. No pertinent past medical history.  History reviewed. No pertinent surgical history.  Social History     Social History    Marital status:      Spouse name: N/A    Number of children: N/A    Years of education: N/A     Occupational History    Not on file.     Social History Main Topics    Smoking status: Current Every Day Smoker     Packs/day: 0.50     Years: 32.00     Types: Cigarettes, Vaping with nicotine    Smokeless tobacco: Never Used    Alcohol use 3.0 oz/week     2 Glasses of wine, 3 Shots of liquor per week    Drug use: No    Sexual activity: Yes     Partners: Female     Birth control/ protection: Condom     Other Topics Concern    Not on file     Social History Narrative    No narrative on file     Family History   Problem Relation Age of Onset    Diabetes Mother     Diabetes Father     Diabetes Sister     Diabetes Brother     Diabetes Maternal Grandmother        Current Outpatient Prescriptions:     amlodipine (NORVASC) 10 MG tablet, Take 1 tablet (10 mg total) by mouth once daily., Disp: 90 tablet, Rfl: 0    hydrochlorothiazide (MICROZIDE) 12.5 mg capsule, Take 1 capsule (12.5 mg total) by mouth once daily., Disp: 90 capsule, Rfl: 3    ciprofloxacin HCl (CILOXAN) 0.3 % ophthalmic solution, Place 1 drop into the left eye every 4 (four) hours., Disp: 10 mL, Rfl: 0    erythromycin (ROMYCIN) ophthalmic ointment, Place into the left eye every 4 (four) hours., Disp: 1  "g, Rfl: 0    Review of patient's allergies indicates:   Allergen Reactions    Fish containing products Anaphylaxis     Review of Systems   Negative.     Objective:     /78   Pulse 75   Temp 97.6 °F (36.4 °C) (Tympanic)   Ht 6' 2" (1.88 m)   Wt 110.7 kg (244 lb 0.8 oz)   BMI 31.33 kg/m²     Physical Exam  GEN: A&O fully, NAD  PSYC: Normal affect  HEENT: Left conjunctival erythema, +sensitive to light, no foreign body, no ciliary flush, mild TTP of globe    Lab Results   Component Value Date    WBC 4.43 08/14/2017    HGB 17.2 08/14/2017    HCT 50.8 08/14/2017     08/14/2017    CHOL 140 08/14/2017    TRIG 132 08/14/2017    HDL 42 08/14/2017    ALT 17 09/26/2017    AST 24 09/26/2017     08/14/2017    K 4.2 08/14/2017     08/14/2017    CREATININE 0.9 08/14/2017    BUN 14 08/14/2017    CO2 29 08/14/2017    TSH 0.768 08/14/2017    HGBA1C 5.0 08/14/2017       Assessment:      1. Bacterial conjunctivitis of left eye: 2/2 to trauma with a dirty slipper. Risks and benefits discussed and patient chose to move forward with cipro and erythromycin ophthalmic drop/ointment QID x7 days. Will refer to ophthalmology in case no improvement in 2-3 days.      Plan:   Bacterial conjunctivitis of left eye  -     ciprofloxacin HCl (CILOXAN) 0.3 % ophthalmic solution; Place 1 drop into the left eye every 4 (four) hours.  Dispense: 10 mL; Refill: 0  -     erythromycin (ROMYCIN) ophthalmic ointment; Place into the left eye every 4 (four) hours.  Dispense: 1 g; Refill: 0  -     Ambulatory referral to Ophthalmology      RTC as needed  "

## 2019-02-28 ENCOUNTER — PATIENT OUTREACH (OUTPATIENT)
Dept: ADMINISTRATIVE | Facility: HOSPITAL | Age: 44
End: 2019-02-28